# Patient Record
Sex: FEMALE | Race: ASIAN | Employment: STUDENT
[De-identification: names, ages, dates, MRNs, and addresses within clinical notes are randomized per-mention and may not be internally consistent; named-entity substitution may affect disease eponyms.]

---

## 2017-08-30 LAB
ABORH_EXT (HISTORICAL CONVERSION): NORMAL
ANTIBODY_EXT (HISTORICAL CONVERSION): NEGATIVE
HBSAG_EXT (HISTORICAL CONVERSION): NEGATIVE
HGB_EXT (HISTORICAL CONVERSION): 13.9
HIV_EXT: NEGATIVE
PLT_EXT - HISTORICAL: 237
RPR - HISTORICAL: NORMAL
RUBELLA_EXT (HISTORICAL CONVERSION): NORMAL

## 2017-10-15 ENCOUNTER — HEALTH MAINTENANCE LETTER (OUTPATIENT)
Age: 27
End: 2017-10-15

## 2017-12-28 LAB
HGB_EXT (HISTORICAL CONVERSION): 12.1
PLT_EXT - HISTORICAL: 212

## 2018-02-26 LAB — GBS_EXT (HISTORICAL CONVERSION): NEGATIVE

## 2018-03-28 ENCOUNTER — HOSPITAL ENCOUNTER (OUTPATIENT)
Dept: OBGYN | Facility: HOSPITAL | Age: 28
Discharge: HOME | End: 2018-03-28
Attending: OBSTETRICS & GYNECOLOGY | Admitting: OBSTETRICS & GYNECOLOGY

## 2018-03-29 ENCOUNTER — ANESTHESIA - HEALTHEAST (OUTPATIENT)
Dept: OBGYN | Facility: HOSPITAL | Age: 28
End: 2018-03-29

## 2018-08-23 LAB
ANTIBODY_EXT (HISTORICAL CONVERSION): NORMAL
HBSAG_EXT (HISTORICAL CONVERSION): NORMAL
HGB_EXT (HISTORICAL CONVERSION): 14.3
HIV_EXT: NORMAL
PLT_EXT - HISTORICAL: 244
RUBELLA_EXT (HISTORICAL CONVERSION): NORMAL

## 2019-02-27 LAB — GBS_EXT (HISTORICAL CONVERSION): NEGATIVE

## 2019-03-05 ENCOUNTER — HOSPITAL ENCOUNTER (OUTPATIENT)
Dept: OBGYN | Facility: HOSPITAL | Age: 29
Discharge: HOME HEALTH | End: 2019-03-05
Attending: ADVANCED PRACTICE MIDWIFE | Admitting: ADVANCED PRACTICE MIDWIFE

## 2019-03-05 ASSESSMENT — MIFFLIN-ST. JEOR: SCORE: 1639.88

## 2019-03-19 ENCOUNTER — HOSPITAL ENCOUNTER (OUTPATIENT)
Dept: OBGYN | Facility: HOSPITAL | Age: 29
Discharge: HOME | End: 2019-03-19
Attending: ADVANCED PRACTICE MIDWIFE | Admitting: ADVANCED PRACTICE MIDWIFE

## 2019-03-19 LAB
ABO/RH(D): NORMAL
ALT SERPL W P-5'-P-CCNC: <9 U/L (ref 0–45)
ANTIBODY SCREEN: NEGATIVE
APTT PPP: 24 SECONDS (ref 24–37)
AST SERPL W P-5'-P-CCNC: 11 U/L (ref 0–40)
CREAT SERPL-MCNC: 0.69 MG/DL (ref 0.6–1.1)
CREAT UR-MCNC: 17.3 MG/DL
ERYTHROCYTE [DISTWIDTH] IN BLOOD BY AUTOMATED COUNT: 14.5 % (ref 11–14.5)
GFR SERPL CREATININE-BSD FRML MDRD: >60 ML/MIN/1.73M2
HCT VFR BLD AUTO: 33.3 % (ref 35–47)
HGB BLD-MCNC: 11.3 G/DL (ref 12–16)
INR PPP: 0.9 (ref 0.9–1.1)
MCH RBC QN AUTO: 28.8 PG (ref 27–34)
MCHC RBC AUTO-ENTMCNC: 33.9 G/DL (ref 32–36)
MCV RBC AUTO: 85 FL (ref 80–100)
PLATELET # BLD AUTO: 189 THOU/UL (ref 140–440)
PMV BLD AUTO: 11.2 FL (ref 8.5–12.5)
PROTEIN, RANDOM URINE - HISTORICAL: <7 MG/DL
PROTEIN/CREAT RATIO, RANDOM UR: NORMAL
RBC # BLD AUTO: 3.93 MILL/UL (ref 3.8–5.4)
URATE SERPL-MCNC: 4.9 MG/DL (ref 2–7.5)
WBC: 5.9 THOU/UL (ref 4–11)

## 2019-03-19 ASSESSMENT — MIFFLIN-ST. JEOR: SCORE: 1653.48

## 2020-02-24 ENCOUNTER — HEALTH MAINTENANCE LETTER (OUTPATIENT)
Age: 30
End: 2020-02-24

## 2021-06-02 VITALS — BODY MASS INDEX: 39.01 KG/M2 | HEIGHT: 62 IN | WEIGHT: 212 LBS

## 2021-06-02 VITALS — HEIGHT: 62 IN | WEIGHT: 215 LBS | BODY MASS INDEX: 39.56 KG/M2

## 2021-06-16 PROBLEM — Z34.90 PREGNANT: Status: ACTIVE | Noted: 2018-03-29

## 2021-06-16 PROBLEM — E66.9 OBESITY (BMI 35.0-39.9 WITHOUT COMORBIDITY): Status: ACTIVE | Noted: 2017-09-01

## 2021-06-17 NOTE — ANESTHESIA PREPROCEDURE EVALUATION
Anesthesia Evaluation      Patient summary reviewed   No history of anesthetic complications     Airway   Mallampati: III  Neck ROM: full   Pulmonary - negative ROS                          Cardiovascular - negative ROS   Neuro/Psych - negative ROS     Endo/Other - negative ROS   (+) obesity, pregnant     GI/Hepatic/Renal - negative ROS           Dental                         Anesthesia Plan  Planned anesthetic: epidural  LE.  ASA 2     Anesthetic plan and risks discussed with: patient    Post-op plan: routine recovery          
106

## 2021-06-17 NOTE — PROGRESS NOTES
Patient here for vaginal bleeding that occurred at work around 1430.  Patient states underwear bloody with normal menstral cycle bleeding. Did not wear a pad here, minimal amount of blood seen on toilet paper after voiding.      Call to Aileen Yost CNM, reviewed fetal tracing and maternal contraction pattern.  Cervix was checked in clinic 2 weeks ago, 1 cm dilation.  No cervical exam per LORRAINE Yost today.  Patient to call for increased bleeding or any further changes.  Category 1Tracing obtained.      Discharge instructions reviewed with patient and .  Discharged home.

## 2021-06-17 NOTE — ANESTHESIA POSTPROCEDURE EVALUATION
Patient: Lilian Bach  Labor epidural  Anesthesia type: No value filed.    Patient location: PACU  Last vitals: There were no vitals filed for this visit.  Post vital signs: stable  Level of consciousness: awake and responds to simple questions  Post-anesthesia pain: pain controlled  Post-anesthesia nausea and vomiting: no  Pulmonary: unassisted, return to baseline  Cardiovascular: stable and blood pressure at baseline  Hydration: adequate  Anesthetic events: no    QCDR Measures:  ASA# 11 - Nan-op Cardiac Arrest: ASA11B - Patient did NOT experience unanticipated cardiac arrest  ASA# 12 - Nan-op Mortality Rate: ASA12B - Patient did NOT die  ASA# 13 - PACU Re-Intubation Rate: ASA13B - Patient did NOT require a new airway mgmt  ASA# 10 - Composite Anes Safety: ASA10A - No serious adverse event    Additional Notes:Lilian Bach  The patient is status post epidural placement.  The effects of the epidural have worn off.  The patient has no headache, and no back pain.  There is no need for follow up.  LMP 06/16/2017     SX=607/64  P=100  Per chart review on 4/1/18. Very stable. No need for follow up

## 2021-06-17 NOTE — ANESTHESIA PROCEDURE NOTES
Epidural Block    Patient location during procedure: OB  Time Called: 3/29/2018 11:50 PM  Reason for Block:labor epidural  Staffing:  Performing  Anesthesiologist: DARSHAN HILL  Preanesthetic Checklist  Completed: patient identified, risks, benefits, and alternatives discussed, timeout performed, consent obtained, at patient's request, airway assessed, oxygen available, suction available, emergency drugs available and hand hygiene performed  Procedure  Patient position: sitting  Prep: ChloraPrep and site prepped and draped  Patient monitoring: blood pressure, heart rate and continuous pulse oximetry  Approach: midline  Location: L3-L4  Injection technique: TOÑA saline  Number of Attempts:1  Needle  Needle type: Araceli   Needle gauge: 18 G     Catheter in Space: 4 (6.5 cm to EDS)  Assessment  Sensory level:  No complications

## 2021-06-24 NOTE — PROGRESS NOTES
1235 Pt ambulatory to [facility] after being seen in clinic. Pt was noted to have recurrent decels in clinic. Pt was instructed to come to [facility] for further evaluation. Pt placed on monitor

## 2021-06-25 NOTE — PROGRESS NOTES
arrives at 39.1 from clinic to be evaluated for high blood pressure and non-reactive NST. Patient reports having a headache at this time, and reports having headaches more frequently this past week. Patient also reports having some blurry vision a couple times the other day, that didn't last long and resolved itself. Patient declines any epigastric pain, and reports she has not noticed any swelling at all. Patient reports positive FM and declines bleeding, LOF, or any other problems with her pregnancy.    Per RN assessment patient noted to have normal reflexes and minimal swelling, just in her ankles. Initially BP are noted to be WNL and baby is reactive. HD, CNM has been updated. Plan at this time is to proceed with a HELLP panel and for continuous monitoring. RN will update CNM when results are back.

## 2024-11-25 ENCOUNTER — LAB REQUISITION (OUTPATIENT)
Dept: LAB | Facility: CLINIC | Age: 34
End: 2024-11-25
Payer: COMMERCIAL

## 2024-11-25 DIAGNOSIS — Z36.9 ENCOUNTER FOR ANTENATAL SCREENING, UNSPECIFIED: ICD-10-CM

## 2024-11-25 LAB
ABO + RH BLD: NORMAL
BASOPHILS # BLD AUTO: 0 10E3/UL (ref 0–0.2)
BASOPHILS NFR BLD AUTO: 0 %
BLD GP AB SCN SERPL QL: NEGATIVE
EOSINOPHIL # BLD AUTO: 0.1 10E3/UL (ref 0–0.7)
EOSINOPHIL NFR BLD AUTO: 1 %
ERYTHROCYTE [DISTWIDTH] IN BLOOD BY AUTOMATED COUNT: 13.1 % (ref 10–15)
EST. AVERAGE GLUCOSE BLD GHB EST-MCNC: 105 MG/DL
HBA1C MFR BLD: 5.3 %
HBV SURFACE AG SERPL QL IA: NONREACTIVE
HCT VFR BLD AUTO: 42.6 % (ref 35–47)
HCV AB SERPL QL IA: NONREACTIVE
HGB BLD-MCNC: 14 G/DL (ref 11.7–15.7)
HIV 1+2 AB+HIV1 P24 AG SERPL QL IA: NONREACTIVE
IMM GRANULOCYTES # BLD: 0 10E3/UL
IMM GRANULOCYTES NFR BLD: 0 %
LYMPHOCYTES # BLD AUTO: 1.5 10E3/UL (ref 0.8–5.3)
LYMPHOCYTES NFR BLD AUTO: 22 %
MCH RBC QN AUTO: 29.9 PG (ref 26.5–33)
MCHC RBC AUTO-ENTMCNC: 32.9 G/DL (ref 31.5–36.5)
MCV RBC AUTO: 91 FL (ref 78–100)
MONOCYTES # BLD AUTO: 0.4 10E3/UL (ref 0–1.3)
MONOCYTES NFR BLD AUTO: 6 %
NEUTROPHILS # BLD AUTO: 4.7 10E3/UL (ref 1.6–8.3)
NEUTROPHILS NFR BLD AUTO: 70 %
NRBC # BLD AUTO: 0 10E3/UL
NRBC BLD AUTO-RTO: 0 /100
PLATELET # BLD AUTO: 239 10E3/UL (ref 150–450)
RBC # BLD AUTO: 4.69 10E6/UL (ref 3.8–5.2)
RUBV IGG SERPL QL IA: 1 INDEX
RUBV IGG SERPL QL IA: POSITIVE
SPECIMEN EXP DATE BLD: NORMAL
T PALLIDUM AB SER QL: NONREACTIVE
WBC # BLD AUTO: 6.8 10E3/UL (ref 4–11)

## 2024-11-25 PROCEDURE — 86762 RUBELLA ANTIBODY: CPT | Mod: ORL | Performed by: NURSE PRACTITIONER

## 2024-11-25 PROCEDURE — 86780 TREPONEMA PALLIDUM: CPT | Mod: ORL | Performed by: NURSE PRACTITIONER

## 2024-11-25 PROCEDURE — 85025 COMPLETE CBC W/AUTO DIFF WBC: CPT | Mod: ORL | Performed by: NURSE PRACTITIONER

## 2024-11-25 PROCEDURE — 87086 URINE CULTURE/COLONY COUNT: CPT | Mod: ORL | Performed by: NURSE PRACTITIONER

## 2024-11-25 PROCEDURE — 87389 HIV-1 AG W/HIV-1&-2 AB AG IA: CPT | Mod: ORL | Performed by: NURSE PRACTITIONER

## 2024-11-25 PROCEDURE — 86803 HEPATITIS C AB TEST: CPT | Mod: ORL | Performed by: NURSE PRACTITIONER

## 2024-11-25 PROCEDURE — 87340 HEPATITIS B SURFACE AG IA: CPT | Mod: ORL | Performed by: NURSE PRACTITIONER

## 2024-11-25 PROCEDURE — 86901 BLOOD TYPING SEROLOGIC RH(D): CPT | Mod: ORL | Performed by: NURSE PRACTITIONER

## 2024-11-25 PROCEDURE — 83036 HEMOGLOBIN GLYCOSYLATED A1C: CPT | Mod: ORL | Performed by: NURSE PRACTITIONER

## 2024-11-26 LAB — BACTERIA UR CULT: NORMAL

## 2025-03-31 ENCOUNTER — LAB REQUISITION (OUTPATIENT)
Dept: LAB | Facility: CLINIC | Age: 35
End: 2025-03-31
Payer: COMMERCIAL

## 2025-03-31 DIAGNOSIS — Z36.89 ENCOUNTER FOR OTHER SPECIFIED ANTENATAL SCREENING: ICD-10-CM

## 2025-03-31 PROCEDURE — 86780 TREPONEMA PALLIDUM: CPT | Mod: ORL | Performed by: REGISTERED NURSE

## 2025-03-31 PROCEDURE — 86780 TREPONEMA PALLIDUM: CPT | Performed by: REGISTERED NURSE

## 2025-04-01 LAB — T PALLIDUM AB SER QL: NONREACTIVE
